# Patient Record
(demographics unavailable — no encounter records)

---

## 2020-04-13 DIAGNOSIS — Z13.9 SCREENING FOR CONDITION: Primary | ICD-10-CM

## 2020-04-13 NOTE — PROGRESS NOTES
Cancer Services COVID-19 Testing  [facility] [address], [place]  [address]    04/13/2020      In an effort to protect our immunocompromised patients from potential exposure to COVID-19, [facility] will now require all patients receiving an infusion, an injection, and/or radiation therapy to be tested for COVID-19 prior to their appointment.  All patients currently under treatment will be tested immediately, and patients initiating new treatment cycles or with one-time appointments (injections, transfusions, etc.) must be tested within 72 hours of their appointment.     Placed COVID-19 test order for patient.  A member of our team is to contact the patient in the near future to explain this process and the rationale behind it and to get the patient scheduled for their COVID-19 test.     The above was completed in accordance with instructions and guidelines set forth by [facility].     Marko Constantino, AFUA, APRN, FNP-BC, AOCNP  Department of Hematology and Oncology  [facility] [place], 3rd Floor  [facility] [address], [place]  [address]  Office phone   [phone]    Date:  04/13/2020